# Patient Record
Sex: MALE | Race: ASIAN | NOT HISPANIC OR LATINO | Employment: FULL TIME | URBAN - METROPOLITAN AREA
[De-identification: names, ages, dates, MRNs, and addresses within clinical notes are randomized per-mention and may not be internally consistent; named-entity substitution may affect disease eponyms.]

---

## 2020-08-11 ENCOUNTER — LAB (OUTPATIENT)
Dept: FAMILY MEDICINE CLINIC | Facility: CLINIC | Age: 35
End: 2020-08-11

## 2020-08-11 ENCOUNTER — OFFICE VISIT (OUTPATIENT)
Dept: FAMILY MEDICINE CLINIC | Facility: CLINIC | Age: 35
End: 2020-08-11

## 2020-08-11 VITALS
SYSTOLIC BLOOD PRESSURE: 122 MMHG | HEART RATE: 87 BPM | HEIGHT: 70 IN | BODY MASS INDEX: 45.1 KG/M2 | DIASTOLIC BLOOD PRESSURE: 82 MMHG | WEIGHT: 315 LBS | OXYGEN SATURATION: 97 % | RESPIRATION RATE: 16 BRPM

## 2020-08-11 DIAGNOSIS — E66.01 CLASS 3 SEVERE OBESITY WITHOUT SERIOUS COMORBIDITY WITH BODY MASS INDEX (BMI) OF 45.0 TO 49.9 IN ADULT, UNSPECIFIED OBESITY TYPE (HCC): ICD-10-CM

## 2020-08-11 DIAGNOSIS — E03.9 HYPOTHYROIDISM, UNSPECIFIED TYPE: ICD-10-CM

## 2020-08-11 DIAGNOSIS — E11.9 TYPE 2 DIABETES MELLITUS WITHOUT COMPLICATION, WITHOUT LONG-TERM CURRENT USE OF INSULIN (HCC): ICD-10-CM

## 2020-08-11 DIAGNOSIS — E11.9 TYPE 2 DIABETES MELLITUS WITHOUT COMPLICATION, WITHOUT LONG-TERM CURRENT USE OF INSULIN (HCC): Primary | ICD-10-CM

## 2020-08-11 LAB
ALBUMIN SERPL-MCNC: 4 G/DL (ref 3.5–5.2)
ALBUMIN/GLOB SERPL: 1.1 G/DL
ALP SERPL-CCNC: 96 U/L (ref 39–117)
ALT SERPL W P-5'-P-CCNC: 45 U/L (ref 1–41)
ANION GAP SERPL CALCULATED.3IONS-SCNC: 9.5 MMOL/L (ref 5–15)
ARTICHOKE IGE QN: 110 MG/DL (ref 0–100)
AST SERPL-CCNC: 15 U/L (ref 1–40)
BASOPHILS # BLD AUTO: 0.03 10*3/MM3 (ref 0–0.2)
BASOPHILS NFR BLD AUTO: 0.5 % (ref 0–1.5)
BILIRUB SERPL-MCNC: 0.4 MG/DL (ref 0–1.2)
BUN SERPL-MCNC: 13 MG/DL (ref 6–20)
BUN/CREAT SERPL: 16.3 (ref 7–25)
CALCIUM SPEC-SCNC: 9.3 MG/DL (ref 8.6–10.5)
CHLORIDE SERPL-SCNC: 97 MMOL/L (ref 98–107)
CHOLEST SERPL-MCNC: 200 MG/DL (ref 0–200)
CO2 SERPL-SCNC: 24.5 MMOL/L (ref 22–29)
CREAT SERPL-MCNC: 0.8 MG/DL (ref 0.76–1.27)
DEPRECATED RDW RBC AUTO: 38.7 FL (ref 37–54)
EOSINOPHIL # BLD AUTO: 0.07 10*3/MM3 (ref 0–0.4)
EOSINOPHIL NFR BLD AUTO: 1.1 % (ref 0.3–6.2)
ERYTHROCYTE [DISTWIDTH] IN BLOOD BY AUTOMATED COUNT: 13.2 % (ref 12.3–15.4)
GFR SERPL CREATININE-BSD FRML MDRD: 110 ML/MIN/1.73
GFR SERPL CREATININE-BSD FRML MDRD: 133 ML/MIN/1.73
GLOBULIN UR ELPH-MCNC: 3.6 GM/DL
GLUCOSE SERPL-MCNC: 347 MG/DL (ref 65–99)
HBA1C MFR BLD: 12.9 % (ref 3.5–5.6)
HCT VFR BLD AUTO: 50.5 % (ref 37.5–51)
HDLC SERPL-MCNC: 28 MG/DL (ref 40–60)
HGB BLD-MCNC: 15.9 G/DL (ref 13–17.7)
IMM GRANULOCYTES # BLD AUTO: 0.05 10*3/MM3 (ref 0–0.05)
IMM GRANULOCYTES NFR BLD AUTO: 0.8 % (ref 0–0.5)
LDLC SERPL CALC-MCNC: ABNORMAL MG/DL
LDLC/HDLC SERPL: ABNORMAL {RATIO}
LYMPHOCYTES # BLD AUTO: 1.87 10*3/MM3 (ref 0.7–3.1)
LYMPHOCYTES NFR BLD AUTO: 29.1 % (ref 19.6–45.3)
MCH RBC QN AUTO: 25.6 PG (ref 26.6–33)
MCHC RBC AUTO-ENTMCNC: 31.5 G/DL (ref 31.5–35.7)
MCV RBC AUTO: 81.5 FL (ref 79–97)
MONOCYTES # BLD AUTO: 0.44 10*3/MM3 (ref 0.1–0.9)
MONOCYTES NFR BLD AUTO: 6.8 % (ref 5–12)
NEUTROPHILS NFR BLD AUTO: 3.97 10*3/MM3 (ref 1.7–7)
NEUTROPHILS NFR BLD AUTO: 61.7 % (ref 42.7–76)
NRBC BLD AUTO-RTO: 0 /100 WBC (ref 0–0.2)
PLATELET # BLD AUTO: 322 10*3/MM3 (ref 140–450)
PMV BLD AUTO: 10.5 FL (ref 6–12)
POTASSIUM SERPL-SCNC: 4.4 MMOL/L (ref 3.5–5.2)
PROT SERPL-MCNC: 7.6 G/DL (ref 6–8.5)
RBC # BLD AUTO: 6.2 10*6/MM3 (ref 4.14–5.8)
SODIUM SERPL-SCNC: 131 MMOL/L (ref 136–145)
TRIGL SERPL-MCNC: 417 MG/DL (ref 0–150)
TSH SERPL DL<=0.05 MIU/L-ACNC: 7.82 UIU/ML (ref 0.27–4.2)
VLDLC SERPL-MCNC: ABNORMAL MG/DL
WBC # BLD AUTO: 6.43 10*3/MM3 (ref 3.4–10.8)

## 2020-08-11 PROCEDURE — 84443 ASSAY THYROID STIM HORMONE: CPT | Performed by: PHYSICIAN ASSISTANT

## 2020-08-11 PROCEDURE — 36415 COLL VENOUS BLD VENIPUNCTURE: CPT

## 2020-08-11 PROCEDURE — 99204 OFFICE O/P NEW MOD 45 MIN: CPT | Performed by: PHYSICIAN ASSISTANT

## 2020-08-11 PROCEDURE — 80061 LIPID PANEL: CPT | Performed by: PHYSICIAN ASSISTANT

## 2020-08-11 PROCEDURE — 83721 ASSAY OF BLOOD LIPOPROTEIN: CPT

## 2020-08-11 PROCEDURE — 85025 COMPLETE CBC W/AUTO DIFF WBC: CPT | Performed by: PHYSICIAN ASSISTANT

## 2020-08-11 PROCEDURE — 83036 HEMOGLOBIN GLYCOSYLATED A1C: CPT | Performed by: PHYSICIAN ASSISTANT

## 2020-08-11 PROCEDURE — 80053 COMPREHEN METABOLIC PANEL: CPT | Performed by: PHYSICIAN ASSISTANT

## 2020-08-11 RX ORDER — LEVOTHYROXINE SODIUM 75 UG/1
75 CAPSULE ORAL DAILY
COMMUNITY
End: 2020-08-13

## 2020-08-11 NOTE — PROGRESS NOTES
"Subjective   Juvenal Melgar is a 35 y.o. male.     Chief Complaint   Patient presents with   • Annual Exam     New patient       /82 (BP Location: Left arm, Patient Position: Sitting, Cuff Size: Large Adult)   Pulse 87   Resp 16   Ht 176.5 cm (69.5\")   Wt (!) 152 kg (335 lb)   SpO2 97%   BMI 48.76 kg/m²     BP Readings from Last 3 Encounters:   08/11/20 122/82       Wt Readings from Last 3 Encounters:   08/11/20 (!) 152 kg (335 lb)       HPI patient presents to the clinic as a new patient for establishment of care for hypothyroidism, type 2 diabetes, and obesity.  He previously saw another provider and was prescribed 75 mcg of levothyroxine and metformin 500 mg twice daily.  He states that he is intermittently compliant with the metformin but takes the levothyroxine on a daily basis.  He states that he has gained weight over quarantined as he is working at home during this time.  He denies having any current symptoms other than weight gain.  He denies having any family history of heart disease but states that his father did have diabetes and had kidney failure.  He denies tobacco use but does drink on an occasional basis.  He has not noticed any significant symptom changes after being placed on the levothyroxine.    The following portions of the patient's history were reviewed and updated as appropriate: allergies, current medications, past family history, past medical history, past social history, past surgical history and problem list.    Review of Systems   Constitutional: Negative for activity change, appetite change and fatigue.   HENT: Negative for congestion, rhinorrhea and sore throat.    Eyes: Negative for blurred vision, pain and visual disturbance.   Respiratory: Negative for cough, chest tightness and shortness of breath.    Cardiovascular: Negative for chest pain and leg swelling.   Gastrointestinal: Negative for abdominal pain, blood in stool and nausea.   Endocrine: Negative for " polydipsia and polyuria.   Genitourinary: Negative for dysuria and urgency.   Musculoskeletal: Negative for arthralgias and back pain.   Skin: Negative for rash and bruise.   Allergic/Immunologic: Negative for environmental allergies.   Neurological: Negative for headache and confusion.   Hematological: Negative for adenopathy. Does not bruise/bleed easily.   Psychiatric/Behavioral: Negative for stress. The patient is not nervous/anxious.        Objective   Physical Exam   Constitutional: He is oriented to person, place, and time. He appears well-developed and well-nourished.   HENT:   Head: Normocephalic and atraumatic.   Eyes: Pupils are equal, round, and reactive to light. Conjunctivae and EOM are normal.   Neck: Normal range of motion. Neck supple.   Cardiovascular: Normal rate and regular rhythm.   No murmur heard.  Pulmonary/Chest: Effort normal and breath sounds normal.   Abdominal: Soft. Bowel sounds are normal. There is no tenderness.   Musculoskeletal: Normal range of motion. He exhibits no deformity.   Lymphadenopathy:     He has no cervical adenopathy.   Neurological: He is alert and oriented to person, place, and time. No cranial nerve deficit.   Skin: Skin is warm and dry. Capillary refill takes less than 2 seconds. No rash noted.   Psychiatric: He has a normal mood and affect. His behavior is normal. Judgment and thought content normal.         Diagnoses and all orders for this visit:    1. Type 2 diabetes mellitus without complication, without long-term current use of insulin (CMS/[facility]) (Primary)  -     Hemoglobin A1c; Future  -     Lipid panel; Future    2. Hypothyroidism, unspecified type  -     TSH; Future    3. Class 3 severe obesity without serious comorbidity with body mass index (BMI) of 45.0 to 49.9 in adult, unspecified obesity type (CMS/[facility])  -     CBC w AUTO Differential; Future  -     Comprehensive metabolic panel; Future  -     Hemoglobin A1c; Future    Depending on the lab work we  discussed potentially adding a GLP-1 agonist for both diabetes management and also weight loss.  We will see what his thyroid panel shows to see what we can do with his thyroid medication as well.    Return in about 3 months (around 11/11/2020), or if symptoms worsen or fail to improve.

## 2020-08-13 ENCOUNTER — TELEPHONE (OUTPATIENT)
Dept: FAMILY MEDICINE CLINIC | Facility: CLINIC | Age: 35
End: 2020-08-13

## 2020-08-13 RX ORDER — LEVOTHYROXINE SODIUM 88 UG/1
88 TABLET ORAL DAILY
Qty: 90 TABLET | Refills: 3 | Status: SHIPPED | OUTPATIENT
Start: 2020-08-13 | End: 2021-08-19

## 2020-08-13 NOTE — TELEPHONE ENCOUNTER
----- Message from José León PA-C sent at 8/13/2020  8:11 AM EDT -----  Hgb A1c is very high. 12.9. That is very concerning and we need to increase metformin to 1000mg bid. I also am going to need to add another medication that we discussed called ozempic. If insurance does not cover he needs to let me know asap so I can switch it. I also am increasing thyroid dose to 88 mcg of levothyroxine as his levels are still low.

## 2020-08-17 ENCOUNTER — TELEPHONE (OUTPATIENT)
Dept: FAMILY MEDICINE CLINIC | Facility: CLINIC | Age: 35
End: 2020-08-17

## 2020-08-17 NOTE — TELEPHONE ENCOUNTER
Caller: Juvenal Melgar    Relationship: Self    Best call back number: [phone]    What form or medical record are you requesting: PT IS REQUESTING A LETTER OF EXCEPTION FOR HIM TO CONTINUE WORKING FROM HOME, DUE TO HIS INCREASED RISK FROM A BMI OVER 30    Who is requesting this form or medical record from you: PT'S EMPLOYER    How would you like to receive the form or medical records: PT WANTS TO  A HARD COPY WHEN HE COMES BY FOR SAMPLES    Timeframe paperwork needed: ASAP    Additional notes: PT IS REQUESTING A CALL TO LET HIM KNOW WHEN THIS IS READY SO HE CAN STOP BY FOR SAMPLES AND LETTER AT THE SAME TIME

## 2020-08-21 ENCOUNTER — DOCUMENTATION (OUTPATIENT)
Dept: FAMILY MEDICINE CLINIC | Facility: CLINIC | Age: 35
End: 2020-08-21

## 2020-08-21 NOTE — TELEPHONE ENCOUNTER
He was supposed to come to the office to  samples and the letter 3 days ago. All of that is waiting. Thanks.

## 2021-08-19 RX ORDER — LEVOTHYROXINE SODIUM 88 UG/1
88 TABLET ORAL DAILY
Qty: 90 TABLET | Refills: 1 | Status: SHIPPED | OUTPATIENT
Start: 2021-08-19 | End: 2022-02-15

## 2022-02-15 RX ORDER — LEVOTHYROXINE SODIUM 88 UG/1
88 TABLET ORAL DAILY
Qty: 90 TABLET | Refills: 1 | Status: SHIPPED | OUTPATIENT
Start: 2022-02-15 | End: 2022-03-04

## 2022-03-03 ENCOUNTER — OFFICE VISIT (OUTPATIENT)
Dept: FAMILY MEDICINE CLINIC | Facility: CLINIC | Age: 37
End: 2022-03-03

## 2022-03-03 ENCOUNTER — LAB (OUTPATIENT)
Dept: FAMILY MEDICINE CLINIC | Facility: CLINIC | Age: 37
End: 2022-03-03

## 2022-03-03 VITALS
RESPIRATION RATE: 12 BRPM | WEIGHT: 315 LBS | DIASTOLIC BLOOD PRESSURE: 50 MMHG | HEART RATE: 95 BPM | BODY MASS INDEX: 45.1 KG/M2 | SYSTOLIC BLOOD PRESSURE: 74 MMHG | HEIGHT: 70 IN | OXYGEN SATURATION: 96 %

## 2022-03-03 DIAGNOSIS — S31.819A WOUND OF GLUTEAL CLEFT, RIGHT, INITIAL ENCOUNTER: Primary | ICD-10-CM

## 2022-03-03 DIAGNOSIS — E03.9 HYPOTHYROIDISM, UNSPECIFIED TYPE: ICD-10-CM

## 2022-03-03 DIAGNOSIS — E11.65 TYPE 2 DIABETES MELLITUS WITH HYPERGLYCEMIA, WITHOUT LONG-TERM CURRENT USE OF INSULIN: ICD-10-CM

## 2022-03-03 LAB — HBA1C MFR BLD: 12.9 % (ref 3.5–5.6)

## 2022-03-03 PROCEDURE — 84443 ASSAY THYROID STIM HORMONE: CPT | Performed by: PHYSICIAN ASSISTANT

## 2022-03-03 PROCEDURE — 99214 OFFICE O/P EST MOD 30 MIN: CPT | Performed by: PHYSICIAN ASSISTANT

## 2022-03-03 PROCEDURE — 36415 COLL VENOUS BLD VENIPUNCTURE: CPT

## 2022-03-03 PROCEDURE — 83036 HEMOGLOBIN GLYCOSYLATED A1C: CPT | Performed by: PHYSICIAN ASSISTANT

## 2022-03-03 PROCEDURE — 84439 ASSAY OF FREE THYROXINE: CPT | Performed by: PHYSICIAN ASSISTANT

## 2022-03-03 NOTE — PROGRESS NOTES
"Subjective   Juvenal Melgar is a 36 y.o. male.     Chief Complaint   Patient presents with   • Abscess       BP (!) 74/50   Pulse 95   Resp 12   Ht 177.8 cm (70\")   Wt (!) 144 kg (317 lb)   SpO2 96%   BMI 45.48 kg/m²     BP Readings from Last 3 Encounters:   03/03/22 (!) 74/50   08/11/20 122/82       Wt Readings from Last 3 Encounters:   03/03/22 (!) 144 kg (317 lb)   08/11/20 (!) 152 kg (335 lb)       HPI Presents to the clinic for abscess on the right butt cheek that started last week. He was seen in the er and had a ct scan done that showed infection with no spread. He states that it is painful and smells. The area opened up and drained pus. He is currently on doxycycline. He saw me almost two years ago for diabetic management. His a1c was very elevated and I started him on ozempic as well as metformin. He never took the ozempic and has not followed up since. He does not check his blood sugars. He has hypothyroid and has not had follow up on this either since our last visit.     The following portions of the patient's history were reviewed and updated as appropriate: allergies, current medications, past family history, past medical history, past social history, past surgical history and problem list.    Review of Systems    Objective   Physical Exam  Constitutional:       Appearance: He is well-developed. He is obese.   HENT:      Head: Normocephalic and atraumatic.   Eyes:      Conjunctiva/sclera: Conjunctivae normal.      Pupils: Pupils are equal, round, and reactive to light.   Cardiovascular:      Rate and Rhythm: Normal rate and regular rhythm.      Heart sounds: No murmur heard.  Pulmonary:      Effort: Pulmonary effort is normal.      Breath sounds: Normal breath sounds.   Abdominal:      General: Bowel sounds are normal.      Palpations: Abdomen is soft.      Tenderness: There is no abdominal tenderness.   Musculoskeletal:         General: No deformity. Normal range of motion.      Cervical back: " Normal range of motion and neck supple.   Lymphadenopathy:      Cervical: No cervical adenopathy.   Skin:     General: Skin is warm and dry.      Capillary Refill: Capillary refill takes less than 2 seconds.      Findings: No rash.      Comments: There is a 2 inch x 2 inch opened abscess with deep cavity not to the level of the bone. This is malodorous and does not streak.    Neurological:      Mental Status: He is alert and oriented to person, place, and time.      Cranial Nerves: No cranial nerve deficit.   Psychiatric:         Behavior: Behavior normal.         Thought Content: Thought content normal.         Judgment: Judgment normal.           Diagnoses and all orders for this visit:    1. Wound of gluteal cleft, right, initial encounter (Primary)  Comments:  continue doxy and wound changes. send to wound care.   Orders:  -     Ambulatory Referral to Wound Clinic    2. Type 2 diabetes mellitus with hyperglycemia, without long-term current use of insulin (HCC)  Comments:  we will likely need to start insulin especially with this new wound. we discussed this. check a1c. make judgment on that. follow up in 1 week.   Orders:  -     Hemoglobin A1c; Future    3. Hypothyroidism, unspecified type  Comments:  check tsh.   Orders:  -     Hemoglobin A1c; Future  -     TSH Rfx On Abnormal To Free T4; Future        Return in about 1 week (around 3/10/2022), or if symptoms worsen or fail to improve.

## 2022-03-04 ENCOUNTER — TELEPHONE (OUTPATIENT)
Dept: FAMILY MEDICINE CLINIC | Facility: CLINIC | Age: 37
End: 2022-03-04

## 2022-03-04 DIAGNOSIS — E11.65 TYPE 2 DIABETES MELLITUS WITH HYPERGLYCEMIA, WITHOUT LONG-TERM CURRENT USE OF INSULIN: Primary | ICD-10-CM

## 2022-03-04 LAB
T4 FREE SERPL-MCNC: 1.4 NG/DL (ref 0.93–1.7)
TSH SERPL DL<=0.05 MIU/L-ACNC: 13.3 UIU/ML (ref 0.27–4.2)

## 2022-03-04 RX ORDER — LEVOTHYROXINE SODIUM 0.1 MG/1
100 TABLET ORAL DAILY
Qty: 90 TABLET | Refills: 0 | Status: SHIPPED | OUTPATIENT
Start: 2022-03-04 | End: 2022-06-30

## 2022-03-04 NOTE — TELEPHONE ENCOUNTER
Caller: Juvenal Melgar    Relationship: Self    Best call back number: [phone]     What is the best time to reach you: ANYTIME     Who are you requesting to speak with (clinical staff, provider,  specific   staff member): CALVIN JEFFERSON     Do you know the name of the person who called: JUVENAL    What was the call regarding: JUVENAL WOULD LIKE A CALL BACK   REGARDING THE DIABETIC MEDICATION OPTIONS THAT WERE GIVEN TO HIM    Do you require a callback:YES

## 2022-03-04 NOTE — TELEPHONE ENCOUNTER
Patient was wanting to know if you would try Ozempic first with the metformin before going on insulin

## 2022-03-07 PROBLEM — S31.809A WOUND OF GLUTEAL CLEFT: Status: ACTIVE | Noted: 2022-03-07

## 2022-03-07 PROBLEM — E11.65 TYPE 2 DIABETES MELLITUS WITH HYPERGLYCEMIA, WITHOUT LONG-TERM CURRENT USE OF INSULIN (HCC): Status: ACTIVE | Noted: 2020-08-11

## 2022-03-08 ENCOUNTER — LAB REQUISITION (OUTPATIENT)
Dept: LAB | Facility: HOSPITAL | Age: 37
End: 2022-03-08

## 2022-03-08 ENCOUNTER — OFFICE VISIT (OUTPATIENT)
Dept: WOUND CARE | Facility: HOSPITAL | Age: 37
End: 2022-03-08

## 2022-03-08 DIAGNOSIS — L02.31 CUTANEOUS ABSCESS OF BUTTOCK: ICD-10-CM

## 2022-03-08 PROCEDURE — 87205 SMEAR GRAM STAIN: CPT | Performed by: SURGERY

## 2022-03-08 PROCEDURE — G0463 HOSPITAL OUTPT CLINIC VISIT: HCPCS

## 2022-03-08 PROCEDURE — 87070 CULTURE OTHR SPECIMN AEROBIC: CPT | Performed by: SURGERY

## 2022-03-11 LAB
BACTERIA SPEC AEROBE CULT: NORMAL
GRAM STN SPEC: NORMAL

## 2022-03-15 ENCOUNTER — OFFICE VISIT (OUTPATIENT)
Dept: WOUND CARE | Facility: HOSPITAL | Age: 37
End: 2022-03-15

## 2022-03-15 PROCEDURE — G0463 HOSPITAL OUTPT CLINIC VISIT: HCPCS

## 2022-03-29 ENCOUNTER — APPOINTMENT (OUTPATIENT)
Dept: WOUND CARE | Facility: HOSPITAL | Age: 37
End: 2022-03-29

## 2022-04-05 ENCOUNTER — OFFICE VISIT (OUTPATIENT)
Dept: WOUND CARE | Facility: HOSPITAL | Age: 37
End: 2022-04-05

## 2022-04-05 PROCEDURE — G0463 HOSPITAL OUTPT CLINIC VISIT: HCPCS

## 2022-04-22 ENCOUNTER — OFFICE VISIT (OUTPATIENT)
Dept: WOUND CARE | Facility: HOSPITAL | Age: 37
End: 2022-04-22

## 2022-05-06 ENCOUNTER — APPOINTMENT (OUTPATIENT)
Dept: WOUND CARE | Facility: HOSPITAL | Age: 37
End: 2022-05-06

## 2022-06-27 NOTE — PROGRESS NOTES
"  Chief Complaint  Sleep Apnea (New patient)    Subjective          Juvenal Melgar presents to [facility]  History of Present Illness  New VIVIANA patient currently on cpap machine, patient states he is benefiting from pap therapy, he states his machine is about 5 yrs old, he uses [employer] and gets supplies online.    Sleep testing history:    On NPSG 4 or 5 years ago. Not available     PAP download: compliance 100% on report on pts phone, ahi <2    Roosevelt Sleepiness Scale:  Sitting and reading 0 WatchingTV 0  Sitting, inactive, in a public place 0  As a passenger in a car for 1 hour w/o a break  0  Lying down to rest in the afternoon  1  Sitting and talking to someone  0  Sitting quietly after a lunch  0  In a car, while stopped for traffic or a light  0  Total 1    Review of Systems   Respiratory: Positive for apnea.    All other systems reviewed and are negative.        Objective   Vital Signs:   /98   Pulse 76   Temp 98.7 °F (37.1 °C) (Temporal)   Ht 177.8 cm (70\")   Wt (!) 144 kg (317 lb)   BMI 45.48 kg/m²     Physical Exam  Vitals reviewed.   Eyes:      Pupils: Pupils are equal, round, and reactive to light.   Cardiovascular:      Rate and Rhythm: Normal rate.   Pulmonary:      Effort: Pulmonary effort is normal. No respiratory distress.   Neurological:      Mental Status: He is alert and oriented to person, place, and time.   Psychiatric:         Mood and Affect: Mood normal.        Result Review :                 Assessment and Plan    Diagnoses and all orders for this visit:    1. VIVIANA (obstructive sleep apnea) (Primary)      The patient is compliant with and benefiting from PAP therapy.    Will obtain hst and order new CPAP machine from [facility]   Pt is interested in obtaining a travel machine.         Follow Up   Return in about 3 months (around 9/30/2022).    Patient was given instructions and counseling regarding his condition or for health maintenance advice. Please see " specific information pulled into the AVS if appropriate.       This document has been electronically signed by Joseph Seipel, MD on June 30, 2022 12:00 EDT

## 2022-06-30 ENCOUNTER — OFFICE VISIT (OUTPATIENT)
Dept: NEUROLOGY | Facility: CLINIC | Age: 37
End: 2022-06-30

## 2022-06-30 VITALS
BODY MASS INDEX: 45.1 KG/M2 | HEART RATE: 76 BPM | DIASTOLIC BLOOD PRESSURE: 98 MMHG | HEIGHT: 70 IN | WEIGHT: 315 LBS | SYSTOLIC BLOOD PRESSURE: 135 MMHG | TEMPERATURE: 98.7 F

## 2022-06-30 DIAGNOSIS — G47.33 OSA (OBSTRUCTIVE SLEEP APNEA): Primary | ICD-10-CM

## 2022-06-30 PROCEDURE — 99204 OFFICE O/P NEW MOD 45 MIN: CPT | Performed by: PSYCHIATRY & NEUROLOGY

## 2022-06-30 RX ORDER — LEVOTHYROXINE SODIUM 0.12 MG/1
TABLET ORAL
COMMUNITY
Start: 2022-06-15

## 2022-06-30 RX ORDER — LANOLIN ALCOHOL/MO/W.PET/CERES
CREAM (GRAM) TOPICAL
COMMUNITY
Start: 2022-06-15

## 2022-06-30 RX ORDER — DAPAGLIFLOZIN 5 MG/1
TABLET, FILM COATED ORAL
COMMUNITY
Start: 2022-06-14

## 2022-06-30 RX ORDER — DULAGLUTIDE 0.75 MG/.5ML
INJECTION, SOLUTION SUBCUTANEOUS
COMMUNITY
Start: 2022-06-14

## 2022-06-30 RX ORDER — ATORVASTATIN CALCIUM 20 MG/1
TABLET, FILM COATED ORAL
COMMUNITY
Start: 2022-06-15

## 2022-07-12 ENCOUNTER — TELEPHONE (OUTPATIENT)
Dept: NEUROLOGY | Facility: CLINIC | Age: 37
End: 2022-07-12

## 2022-07-12 NOTE — TELEPHONE ENCOUNTER
PATIENT CALLED IN REGARDING SLEEP REFERRAL FROM DR. SEIPEL.  PATIENT HAS NOT BEEN ABLE TO MAKE CONTACT, HAS CALLED SEVERAL TIMES TO SCHEDULE BUT IS NOT REACHING ANYONE.    IS THERE ANOTHER LOCATION DR. SEIPEL CAN RECOMMEND FOR THE PATIENT.     PATIENT STATES HE NEEDS A NEW CPAP MACHINE.    PLEASE CALL AND ADVISE.    THANK YOU

## 2022-07-20 ENCOUNTER — HOSPITAL ENCOUNTER (OUTPATIENT)
Dept: SLEEP MEDICINE | Facility: HOSPITAL | Age: 37
Discharge: HOME | End: 2022-07-20
Admitting: PSYCHIATRY & NEUROLOGY

## 2022-07-20 DIAGNOSIS — G47.33 OSA (OBSTRUCTIVE SLEEP APNEA): ICD-10-CM

## 2022-07-20 PROCEDURE — 95806 SLEEP STUDY UNATT&RESP EFFT: CPT | Performed by: PSYCHIATRY & NEUROLOGY

## 2022-07-20 PROCEDURE — 95806 SLEEP STUDY UNATT&RESP EFFT: CPT

## 2022-07-25 ENCOUNTER — TELEPHONE (OUTPATIENT)
Dept: NEUROLOGY | Facility: CLINIC | Age: 37
End: 2022-07-25

## 2022-07-25 NOTE — TELEPHONE ENCOUNTER
Provider: JOSEPH SEIPEL, MD     Caller: LUIS EDUARDO    Relationship to Patient: PT    Phone Number: [phone]    Reason for Call: PT IS CALLING REGARDING HIS HOME SLEEP STUDY.   STATES HE NEEDS A NEW CPAP MACHINE.      PLEASE CALL & ADVISE

## 2022-07-27 ENCOUNTER — TELEPHONE (OUTPATIENT)
Dept: NEUROLOGY | Facility: CLINIC | Age: 37
End: 2022-07-27

## 2022-07-27 DIAGNOSIS — G47.33 OBSTRUCTIVE SLEEP APNEA: Primary | ICD-10-CM

## 2022-07-27 NOTE — TELEPHONE ENCOUNTER
----- Message from Joseph F Seipel, MD sent at 7/21/2022  5:25 PM EDT -----  Home sleep test completed set up on auto CPAP minimum 7 maximum 17 unless he knows what pressure his old CPAP machine was set on

## 2022-08-01 ENCOUNTER — TELEPHONE (OUTPATIENT)
Dept: NEUROLOGY | Facility: CLINIC | Age: 37
End: 2022-08-01

## 2022-08-01 DIAGNOSIS — G47.33 OBSTRUCTIVE SLEEP APNEA: Primary | ICD-10-CM

## 2022-08-01 NOTE — TELEPHONE ENCOUNTER
PATIENT REQUESTING SCRIPT FOR TRAVEL CPAP MACHINE - HE WAS ADVISED BY DME TO ORDER ONLINE.    PLEASE ADVISE.    THANK YOU

## 2022-08-08 ENCOUNTER — APPOINTMENT (OUTPATIENT)
Dept: SLEEP MEDICINE | Facility: HOSPITAL | Age: 37
End: 2022-08-08